# Patient Record
Sex: FEMALE | Race: WHITE | NOT HISPANIC OR LATINO | Employment: OTHER | URBAN - NONMETROPOLITAN AREA
[De-identification: names, ages, dates, MRNs, and addresses within clinical notes are randomized per-mention and may not be internally consistent; named-entity substitution may affect disease eponyms.]

---

## 2019-07-17 ENCOUNTER — EVALUATION (OUTPATIENT)
Dept: PHYSICAL THERAPY | Facility: CLINIC | Age: 84
End: 2019-07-17
Payer: MEDICARE

## 2019-07-17 DIAGNOSIS — M17.12 PRIMARY OSTEOARTHRITIS OF LEFT KNEE: Primary | ICD-10-CM

## 2019-07-17 DIAGNOSIS — M25.562 CHRONIC PAIN OF LEFT KNEE: ICD-10-CM

## 2019-07-17 DIAGNOSIS — G89.29 CHRONIC PAIN OF LEFT KNEE: ICD-10-CM

## 2019-07-17 PROCEDURE — 97161 PT EVAL LOW COMPLEX 20 MIN: CPT | Performed by: PHYSICAL THERAPIST

## 2019-07-17 PROCEDURE — 97110 THERAPEUTIC EXERCISES: CPT | Performed by: PHYSICAL THERAPIST

## 2019-07-17 PROCEDURE — 97140 MANUAL THERAPY 1/> REGIONS: CPT | Performed by: PHYSICAL THERAPIST

## 2019-07-17 PROCEDURE — 97535 SELF CARE MNGMENT TRAINING: CPT | Performed by: PHYSICAL THERAPIST

## 2019-07-17 NOTE — PROGRESS NOTES
PT Evaluation     Today's date: 2019  Patient name: Lydia Hernandez  : 9/3/1931  MRN: [id number]  Referring provider: Wilkie Gosselin  Dx:   Encounter Diagnosis     ICD-10-CM    1  Primary osteoarthritis of left knee M17 12    2  Chronic pain of left knee M25 562     G89 29                   Assessment  Understanding of Dx/Px/POC: good   Prognosis: good    Goals  STGs: To be complete within 4 weeks  - Decrease pain to < 2/10 at worst  - Increase AROM to WNL  - Increase strength to > 4+/5  - Improve gait to WNL for distances < 6 blocks    LTGs: To be complete within 6 weeks  - Able to walk for any extended amount of time/distance without pain or limitation for increased safety and functional capacity with ADLs and home-related duty  - Able to repetitively squat without pain or limitation for increased safety and functional capacity with ADLs and home-related duty  - Able to repetitively ascend/descend a full flight of stairs without pain or limitation for increased safety and functional capacity with ADLs and home-related duty  - Able to repetitively complete transfers without pain or limitation for increased safety and functional capacity with ADLs and home-related duty  - Able to kneel for any extended amount of time without pain or limitation for increased safety and functional capacity with ADLs and home-related duty    Plan  Frequency: 2x week  Duration in weeks: 4       Pt is a very pleasant 80 y o  female with L knee pain who presents with functional deficits including decreased capacity with walking, squatting, kneeling, stairs, and transfers  Upon completion of today's initial evaluation, Jill's sx remain consistent with L knee pain secondary to chronic OA, as well as s/p acute exacerbation of OA > 3 months ago  Patient will benefit from skilled physical therapy to address current deficits            Subjective Evaluation    Pain  Current pain ratin  At best pain ratin  At worst pain rating: 6  Location: L Knee       Pt reports L knee pain for > 3 months  Pt reports pain and stiffness has continued since carrying laundry a few months ago and tripping and falling landing on her L knee  Pt reports her pain and stiffness continues to negatively effect her overall safety and functional capacity with ADLs and home-related duty            Objective Pain level ranges 1-6/10  AROM: L Knee 0-85 degrees; R Knee 0-130 degrees  Strength: L knee 3+/5; R knee 5/5  Gait: Mild shuffle pattern, shortened step length  Swelling: (Mild to mod (will continue to monitor)  Unable to walk without pain and limitation  Unable to squat without pain and limitation  Unable complete transfers without pain and limitation  Unable to ascend/descend stairs without pain and limitation  Unable to kneel without pain and limitation           Precautions: None at this time    Daily Treatment Diary    HEP: Sheet provided and discussed    Manual  7/17/19            ART x15'            IASTM             JM             PROM and LE stretch             STM               Exercise Diary  7/17/19            Gait Correction x10'            TM             Bike             NuStep             Standing 1/2 Roll calf stretch             Towel Calf Stretch 4x20''            HR/TR 2x10            TB TKE 2x10            Ankle Pumps 6x20''                         TB Heel to Toes             Forward/backward heel to toe walk             Sit to stand             No shoe AE [employer] with Add squeeze             SL Bridge             Clam shells             SL Sit to Stand             BOSU SLB             Upside down BOSU SL squat holds             TB Lat Walks             Step ups/downs             HS into QS 3x10                Modalities  7/17/19            MHP             CP x10'            Stim

## 2019-07-17 NOTE — LETTER
2019    [facility] Ol[facility]a  [address]  [address]  [place] [address]    Patient: Mihaela January   YOB: 1931   Date of Visit: 2019     Encounter Diagnosis     ICD-10-CM    1  Primary osteoarthritis of left knee M17 12    2  Chronic pain of left knee M25 562     G89 29        Dear Dr Cat Norton:    Please review the attached Plan of Care from [facility] recent visit  Please verify that you agree therapy should continue by signing the attached document and sending it back to our office  If you have any questions or concerns, please don't hesitate to call  Sincerely,    Lincoln Files, PT, DPT, ATC, ART      Referring Provider:      I certify that I have read the below Plan of Care and certify the need for these services furnished under this plan of treatment while under my care  Tennis Olvera  [address]  [address]  [place] [address]  VIA Facsimile: [phone]          PT Evaluation     Today's date: 2019  Patient name: Mihaela January  : 9/3/1931  MRN: [id number]  Referring provider: Leticia Apodaca  Dx:   Encounter Diagnosis     ICD-10-CM    1  Primary osteoarthritis of left knee M17 12    2   Chronic pain of left knee M25 562     G89 29                   Assessment  Understanding of Dx/Px/POC: good   Prognosis: good    Goals  STGs: To be complete within 4 weeks  - Decrease pain to < 2/10 at worst  - Increase AROM to WNL  - Increase strength to > 4+/5  - Improve gait to WNL for distances < 6 blocks    LTGs: To be complete within 6 weeks  - Able to walk for any extended amount of time/distance without pain or limitation for increased safety and functional capacity with ADLs and home-related duty  - Able to repetitively squat without pain or limitation for increased safety and functional capacity with ADLs and home-related duty  - Able to repetitively ascend/descend a full flight of stairs without pain or limitation for increased safety and functional capacity with ADLs and home-related duty  - Able to repetitively complete transfers without pain or limitation for increased safety and functional capacity with ADLs and home-related duty  - Able to kneel for any extended amount of time without pain or limitation for increased safety and functional capacity with ADLs and home-related duty    Plan  Frequency: 2x week  Duration in weeks: 4       Pt is a very pleasant 80 y o  female with L knee pain who presents with functional deficits including decreased capacity with walking, squatting, kneeling, stairs, and transfers  Upon completion of today's initial evaluation, Jill's sx remain consistent with L knee pain secondary to chronic OA, as well as s/p acute exacerbation of OA > 3 months ago  Patient will benefit from skilled physical therapy to address current deficits  Subjective Evaluation    Pain  Current pain ratin  At best pain ratin  At worst pain ratin  Location: L Knee       Pt reports L knee pain for > 3 months  Pt reports pain and stiffness has continued since carrying laundry a few months ago and tripping and falling landing on her L knee  Pt reports her pain and stiffness continues to negatively effect her overall safety and functional capacity with ADLs and home-related duty            Objective Pain level ranges 1-6/10  AROM: L Knee 0-85 degrees; R Knee 0-130 degrees  Strength: L knee 3+/5; R knee 5/5  Gait: Mild shuffle pattern, shortened step length  Swelling: (Mild to mod (will continue to monitor)  Unable to walk without pain and limitation  Unable to squat without pain and limitation  Unable complete transfers without pain and limitation  Unable to ascend/descend stairs without pain and limitation  Unable to kneel without pain and limitation           Precautions: None at this time    Daily Treatment Diary    HEP: Sheet provided and discussed    Manual  19            ART x15' IASTM             JM             PROM and LE stretch             STM               Exercise Diary  7/17/19            Gait Correction x10'            TM             Bike             NuStep             Standing 1/2 Roll calf stretch             Towel Calf Stretch 4x20''            HR/TR 2x10            TB TKE 2x10            Ankle Pumps 6x20''                         TB Heel to Toes             Forward/backward heel to toe walk             Sit to stand             No shoe AE [employer] with Add squeeze             SL Bridge             Clam shells             SL Sit to Stand             BOSU SLB             Upside down BOSU SL squat holds             TB Lat Walks             Step ups/downs             HS into QS 3x10                Modalities  7/17/19            MHP             CP x10'            Stim

## 2019-07-18 ENCOUNTER — TRANSCRIBE ORDERS (OUTPATIENT)
Dept: PHYSICAL THERAPY | Facility: CLINIC | Age: 84
End: 2019-07-18

## 2019-07-18 DIAGNOSIS — M17.12 OSTEOARTHRITIS OF LEFT KNEE, UNSPECIFIED OSTEOARTHRITIS TYPE: Primary | ICD-10-CM

## 2019-07-19 ENCOUNTER — OFFICE VISIT (OUTPATIENT)
Dept: PHYSICAL THERAPY | Facility: CLINIC | Age: 84
End: 2019-07-19
Payer: MEDICARE

## 2019-07-19 DIAGNOSIS — M25.562 CHRONIC PAIN OF LEFT KNEE: ICD-10-CM

## 2019-07-19 DIAGNOSIS — M17.12 PRIMARY OSTEOARTHRITIS OF LEFT KNEE: Primary | ICD-10-CM

## 2019-07-19 DIAGNOSIS — G89.29 CHRONIC PAIN OF LEFT KNEE: ICD-10-CM

## 2019-07-19 PROCEDURE — 97110 THERAPEUTIC EXERCISES: CPT

## 2019-07-19 PROCEDURE — 97140 MANUAL THERAPY 1/> REGIONS: CPT

## 2019-07-19 NOTE — PROGRESS NOTES
Daily Note     Today's date: 2019  Patient name: Mihaela January  : 9/3/1931  MRN: [id number]  Referring provider: Leticia Apodaca MD  Dx:   Encounter Diagnosis     ICD-10-CM    1  Primary osteoarthritis of left knee M17 12    2  Chronic pain of left knee M25 562     G89 29                   Subjective: Pt reports improvement and is doing HEP  States Heel slides are helpful  Objective: See treatment diary below      Assessment: Tolerated treatment well  Patient exhibited good technique with therapeutic exercises  Pt able to complete program with overall good hua  Pt with decreased c/o pain and improved overall rom  Plan: Continue per plan of care        Precautions: None at this time    Daily Treatment Diary    HEP: Sheet provided and discussed    Manual  19           ART x15'            IASTM             JM             PROM and LE stretch  15'           STM               Exercise Diary  19           Gait Correction x10'            TM             Bike             NuStep  10'           Standing 1/2 Roll calf stretch  6/20"           Towel Calf Stretch 4x20'' 4/20"           HR/TR 2x10 3/10           TB TKE 2x10 L3 3/10           Ankle Pumps 6x20'' 3/10                        TB Heel to Toes             Forward/backward heel to toe walk  10 laps           Sit to stand             No shoe AE [employer] with Add squeeze             SL Bridge             Clam shells             SL Sit to Stand             BOSU SLB             Upside down BOSU SL squat holds             TB Lat Walks  15 laps no TB           Step ups/downs             HS into QS 3x10 3/10               Modalities  19           MHP             CP x10' 10'           Stim

## 2019-07-22 ENCOUNTER — APPOINTMENT (OUTPATIENT)
Dept: PHYSICAL THERAPY | Facility: CLINIC | Age: 84
End: 2019-07-22
Payer: MEDICARE

## 2019-07-24 ENCOUNTER — OFFICE VISIT (OUTPATIENT)
Dept: PHYSICAL THERAPY | Facility: CLINIC | Age: 84
End: 2019-07-24
Payer: MEDICARE

## 2019-07-24 DIAGNOSIS — M25.562 CHRONIC PAIN OF LEFT KNEE: ICD-10-CM

## 2019-07-24 DIAGNOSIS — M17.12 PRIMARY OSTEOARTHRITIS OF LEFT KNEE: Primary | ICD-10-CM

## 2019-07-24 DIAGNOSIS — G89.29 CHRONIC PAIN OF LEFT KNEE: ICD-10-CM

## 2019-07-24 PROCEDURE — 97140 MANUAL THERAPY 1/> REGIONS: CPT

## 2019-07-24 PROCEDURE — 97110 THERAPEUTIC EXERCISES: CPT

## 2019-07-24 NOTE — PROGRESS NOTES
Daily Note     Today's date: 2019  Patient name: Ty Reyes  : 9/3/1931  MRN: [id number]  Referring provider: Saravanan Nicole MD  Dx:   Encounter Diagnosis     ICD-10-CM    1  Primary osteoarthritis of left knee M17 12    2  Chronic pain of left knee M25 562     G89 29                   Subjective: Pt reports she is doing well  States feeling slightly better  Objective: See treatment diary below      Assessment: Tolerated treatment well  Patient demonstrated fatigue post treatment  Pt able to hua program well with mild increased pain  Pt able to complete program with improved knee ext  Plan: Continue per plan of care        Precautions: None at this time    Daily Treatment Diary    HEP: Sheet provided and discussed    Manual  19          ART x15'            IASTM             JM             PROM and LE stretch  15' 15'          STM               Exercise Diary  19          Gait Correction x10'            TM             Bike             NuStep  10' 10'          Standing 1/2 Roll calf stretch  " "          Towel Calf Stretch 4x20'' " "          HR/TR 2x10 3/10 3/10          TB TKE 2x10 L3 3/10 L3 3/10          Ankle Pumps 6x20'' 3/10 3/10                       TB Heel to Toes             Forward/backward heel to toe walk  10 laps 10 laps          Sit to stand             No shoe AE [employer]             Bridge with Add squeeze             SL Bridge             Clam shells             SL Sit to Stand             BOSU SLB             Upside down BOSU SL squat holds             TB Lat Walks  15 laps no TB 15 laps          Step ups/downs             HS into QS 3x10 3/10 3/10              Modalities  19          MHP             CP x10' 10' 10'          Stim

## 2019-07-25 ENCOUNTER — OFFICE VISIT (OUTPATIENT)
Dept: PHYSICAL THERAPY | Facility: CLINIC | Age: 84
End: 2019-07-25
Payer: MEDICARE

## 2019-07-25 DIAGNOSIS — M17.12 PRIMARY OSTEOARTHRITIS OF LEFT KNEE: Primary | ICD-10-CM

## 2019-07-25 DIAGNOSIS — G89.29 CHRONIC PAIN OF LEFT KNEE: ICD-10-CM

## 2019-07-25 DIAGNOSIS — M25.562 CHRONIC PAIN OF LEFT KNEE: ICD-10-CM

## 2019-07-25 PROCEDURE — 97140 MANUAL THERAPY 1/> REGIONS: CPT

## 2019-07-25 PROCEDURE — 97110 THERAPEUTIC EXERCISES: CPT

## 2019-07-25 NOTE — PROGRESS NOTES
Daily Note     Today's date: 2019  Patient name: Naveed Case  : 9/3/1931  MRN: [id number]  Referring provider: Vianca Gatica MD  Dx:   Encounter Diagnosis     ICD-10-CM    1  Primary osteoarthritis of left knee M17 12    2  Chronic pain of left knee M25 562     G89 29                   Subjective: Pt states her knee bending is improving  States she has no pain but some cont swelling  Objective: See treatment diary below      Assessment: Tolerated treatment well  Patient exhibited good technique with therapeutic exercises  Pt hua program well and cont to improve with overall rom and strength  Pt cont to make gains  Plan: Continue per plan of care        Precautions: None at this time    Daily Treatment Diary    HEP: Sheet provided and discussed    Manual  19         ART x15'            IASTM             JM             PROM and LE stretch  15' 15' 15'         STM               Exercise Diary  19         Gait Correction x10'            TM             Bike             NuStep  10' 10' 10'         Standing 1/2 Roll calf stretch  " " "         Towel Calf Stretch 4x20'' " " "         HR/TR 2x10 3/10 3/10 3/10         TB TKE 2x10 L3 3/10 L3 3/10 L3 3/10         Ankle Pumps 6x20'' 3/10 3/10 3/10                      TB Heel to Toes             Forward/backward heel to toe walk  10 laps 10 laps 10 laps         Sit to stand             No shoe AE [employer]             Bridge with Add squeeze             SL Bridge             Clam shells             SL Sit to Stand             BOSU SLB             Upside down BOSU SL squat holds             TB Lat Walks  15 laps no TB 15 laps 15 laps         Step ups/downs             HS into QS 3x10 3/10 3/10 3/10             Modalities  19         MHP             CP x10' 10' 10' 10'         Stim

## 2019-07-29 ENCOUNTER — OFFICE VISIT (OUTPATIENT)
Dept: PHYSICAL THERAPY | Facility: CLINIC | Age: 84
End: 2019-07-29
Payer: MEDICARE

## 2019-07-29 DIAGNOSIS — M17.12 PRIMARY OSTEOARTHRITIS OF LEFT KNEE: Primary | ICD-10-CM

## 2019-07-29 DIAGNOSIS — G89.29 CHRONIC PAIN OF LEFT KNEE: ICD-10-CM

## 2019-07-29 DIAGNOSIS — M25.562 CHRONIC PAIN OF LEFT KNEE: ICD-10-CM

## 2019-07-29 PROCEDURE — 97110 THERAPEUTIC EXERCISES: CPT | Performed by: PHYSICAL THERAPIST

## 2019-07-29 PROCEDURE — 97140 MANUAL THERAPY 1/> REGIONS: CPT | Performed by: PHYSICAL THERAPIST

## 2019-07-29 PROCEDURE — 97112 NEUROMUSCULAR REEDUCATION: CPT | Performed by: PHYSICAL THERAPIST

## 2019-07-29 NOTE — PROGRESS NOTES
Daily Note     Today's date: 2019  Patient name: Mihaela January  : 9/3/1931  MRN: [id number]  Referring provider: Leticai Apodaca MD  Dx:   Encounter Diagnosis     ICD-10-CM    1  Primary osteoarthritis of left knee M17 12    2  Chronic pain of left knee M25 562     G89 29                   Subjective: Pt reports continue progress  Can tell her AROM is improving each day  Anxious to continue making progress  Objective: See treatment diary below      Assessment: Tolerated treatment well  Patient demonstrated fatigue post treatment, exhibited good technique with therapeutic exercises and would benefit from continued PT  Pt hua program well and cont to improve with overall rom and strength  Pt cont to make gains  AROM 0-105 degrees today  Plan: Continue per plan of care  Progress treatment as tolerated         Precautions: None at this time    Daily Treatment Diary    HEP: Sheet provided and discussed    Manual  19         ART x15'            IASTM             JM             PROM and LE stretch  15' 15' 15'         STM               Exercise Diary  19         Gait Correction x10'            TM             Bike             NuStep  10' 10' 10'         Standing 1/2 Roll calf stretch  6/20" 620" 6"         Towel Calf Stretch 4x20'' 420" " "         HR/TR 2x10 3/10 3/10 3/10         TB TKE 2x10 L3 3/10 L3 3/10 L3 3/10         Ankle Pumps 6x20'' 3/10 3/10 3/10                      TB Heel to Toes             Forward/backward heel to toe walk  10 laps 10 laps 10 laps         Sit to stand             No shoe AE [employer] with Add squeeze             SL Bridge             Clam shells             SL Sit to Stand             BOSU SLB             Upside down BOSU SL squat holds             TB Lat Walks  15 laps no TB 15 laps 15 laps         Step ups/downs             HS into QS 3x10 3/10 3/10 3/10             Modalities 7/17/19 7/19/19 7/24/19 7/29/19         P             CP x10' 10' 10' 10'         Stim

## 2019-07-31 ENCOUNTER — OFFICE VISIT (OUTPATIENT)
Dept: PHYSICAL THERAPY | Facility: CLINIC | Age: 84
End: 2019-07-31
Payer: MEDICARE

## 2019-07-31 DIAGNOSIS — G89.29 CHRONIC PAIN OF LEFT KNEE: ICD-10-CM

## 2019-07-31 DIAGNOSIS — M25.562 CHRONIC PAIN OF LEFT KNEE: ICD-10-CM

## 2019-07-31 DIAGNOSIS — M17.12 PRIMARY OSTEOARTHRITIS OF LEFT KNEE: Primary | ICD-10-CM

## 2019-07-31 PROCEDURE — 97140 MANUAL THERAPY 1/> REGIONS: CPT | Performed by: PHYSICAL THERAPIST

## 2019-07-31 PROCEDURE — 97112 NEUROMUSCULAR REEDUCATION: CPT | Performed by: PHYSICAL THERAPIST

## 2019-07-31 PROCEDURE — 97110 THERAPEUTIC EXERCISES: CPT | Performed by: PHYSICAL THERAPIST

## 2019-07-31 NOTE — PROGRESS NOTES
Daily Note     Today's date: 2019  Patient name: Margot Cassidy  : 9/3/1931  MRN: [id number]  Referring provider: Liliya Lucero MD  Dx:   Encounter Diagnosis     ICD-10-CM    1  Primary osteoarthritis of left knee M17 12    2  Chronic pain of left knee M25 562     G89 29        Start Time: 6995  Stop Time: 1505  Total time in clinic (min): 70 minutes    Subjective: Patient states she forgets to bend knee because she hadn't for so long  No c/o pain today  Objective: See treatment diary below      Assessment: Verbal cueing needed for proper execution of exercises  Avoided knee flexion with backwards walking landing in foot flat causing instability, improved with cueing  Needs to continue to work on knee flexion strength for increased stability and normalization of gait  Did note some redness around wound on distal medial/lateral low leg caused from hitting leg into  2 days ago  Instructed patient to watch area of redness does not get larger  Patient would benefit from continuation of skilled PT  Plan: Continue per plan of care  Progress treatment as tolerated         Precautions: None at this time    Daily Treatment Diary    HEP: Sheet provided and discussed    Manual  19        ART x15'            IASTM             JM             PROM and LE stretch  15' 15' 15' 15'        STM               Exercise Diary  19        Gait Correction x10'            TM             Bike             NuStep  10' 10' 10' L3 10'        Standing 1/2 Roll calf stretch  " " " 6x20"        Towel Calf Stretch 4x20'' " " " "        HR/TR 2x10 3/10 3/10 3/10 3x10        TB TKE 2x10 L3 3/10 L3 3/10 L3 3/10 L3 with towel behind knee 3x10        Ankle Pumps 6x20'' 3/10 3/10 3/10         Hamstring curls     3x10        TB Heel to Toes             Forward/backward heel to toe walk  10 laps 10 laps 10 laps 10 laps         Sit to stand             No shoe AE [employer] with Add squeeze             SL Bridge             Clam shells             SL Sit to Stand             BOSU SLB             Upside down BOSU SL squat holds             TB Lat Walks  15 laps no TB 15 laps 15 laps 15 laps        Step ups/downs     6" 3x10        HS into QS 3x10 3/10 3/10 3/10 3x10            Modalities  7/17/19 7/19/19 7/24/19 7/29/19 7/31        MHP             CP x10' 10' 10' 10' 10'        Stim

## 2019-08-05 ENCOUNTER — OFFICE VISIT (OUTPATIENT)
Dept: PHYSICAL THERAPY | Facility: CLINIC | Age: 84
End: 2019-08-05
Payer: MEDICARE

## 2019-08-05 DIAGNOSIS — G89.29 CHRONIC PAIN OF LEFT KNEE: ICD-10-CM

## 2019-08-05 DIAGNOSIS — M17.12 PRIMARY OSTEOARTHRITIS OF LEFT KNEE: Primary | ICD-10-CM

## 2019-08-05 DIAGNOSIS — M25.562 CHRONIC PAIN OF LEFT KNEE: ICD-10-CM

## 2019-08-05 PROCEDURE — 97140 MANUAL THERAPY 1/> REGIONS: CPT | Performed by: PHYSICAL THERAPIST

## 2019-08-05 PROCEDURE — 97112 NEUROMUSCULAR REEDUCATION: CPT | Performed by: PHYSICAL THERAPIST

## 2019-08-05 PROCEDURE — 97110 THERAPEUTIC EXERCISES: CPT | Performed by: PHYSICAL THERAPIST

## 2019-08-05 NOTE — PROGRESS NOTES
Daily Note     Today's date: 2019  Patient name: Ary Anderson  : 9/3/1931  MRN: [id number]  Referring provider: Rosalind Harrison MD  Dx:   Encounter Diagnosis     ICD-10-CM    1  Primary osteoarthritis of left knee M17 12    2  Chronic pain of left knee M25 562     G89 29                   Subjective: Patient reports she feels like she is progressing with ROM, stability, gait, and strength  Objective: See treatment diary below      Assessment: Verbal cueing needed for proper execution of exercises  Avoided knee flexion with backwards walking landing in foot flat causing instability, improved with cueing  Needs to continue to work on knee flexion strength for increased stability and normalization of gait  Noted improvement in redness around wound on distal medial/lateral low leg caused from hitting leg into  last week  Instructed patient to continued to watch area of redness does not get larger  Will continue to monitor  Patient would benefit from continuation of skilled PT  Plan: Continue per plan of care  Progress treatment as tolerated         Precautions: None at this time    Daily Treatment Diary    HEP: Sheet provided and discussed    Manual  19        ART x15'            IASTM             JM             PROM and LE stretch  15' 15' 15' 15'        STM               Exercise Diary  19        Gait Correction x10'            TM             Bike             NuStep  10' 10' 10' L3 10'        Standing 1/2 Roll calf stretch  " " " 6x20"        Towel Calf Stretch 4x20'' " " " "        HR/TR 2x10 3/10 3/10 3/10 3x10        TB TKE 2x10 L3 3/10 L3 3/10 L3 3/10 L3 with towel behind knee 3x10        Ankle Pumps 6x20'' 3/10 3/10 3/10         Hamstring curls     3x10        TB Heel to Toes             Forward/backward heel to toe walk  10 laps 10 laps 10 laps 10 laps         Sit to stand No shoe AE [employer]             Bridge with Add squeeze             SL Bridge             Clam shells             SL Sit to Stand             BOSU SLB             Upside down BOSU SL squat holds             TB Lat Walks  15 laps no TB 15 laps 15 laps 15 laps        Step ups/downs     6" 3x10        HS into QS 3x10 3/10 3/10 3/10 3x10            Modalities  7/17/19 7/19/19 7/24/19 7/29/19 8/5/19        MHP             CP x10' 10' 10' 10' 10'        Stim

## 2019-08-07 ENCOUNTER — OFFICE VISIT (OUTPATIENT)
Dept: PHYSICAL THERAPY | Facility: CLINIC | Age: 84
End: 2019-08-07
Payer: MEDICARE

## 2019-08-07 DIAGNOSIS — M17.12 PRIMARY OSTEOARTHRITIS OF LEFT KNEE: Primary | ICD-10-CM

## 2019-08-07 DIAGNOSIS — G89.29 CHRONIC PAIN OF LEFT KNEE: ICD-10-CM

## 2019-08-07 DIAGNOSIS — M25.562 CHRONIC PAIN OF LEFT KNEE: ICD-10-CM

## 2019-08-07 PROCEDURE — 97140 MANUAL THERAPY 1/> REGIONS: CPT

## 2019-08-07 PROCEDURE — 97110 THERAPEUTIC EXERCISES: CPT

## 2019-08-07 NOTE — PROGRESS NOTES
Daily Note     Today's date: 2019  Patient name: Fiona Bowman  : 9/3/1931  MRN: [id number]  Referring provider: Odessa Burris MD  Dx:   Encounter Diagnosis     ICD-10-CM    1  Primary osteoarthritis of left knee M17 12    2  Chronic pain of left knee M25 562     G89 29                   Subjective: Pt reports overall improvement  Pt states she cont to make gains and is able to go up/down stairs  Objective: See treatment diary below      Assessment: Tolerated treatment well  Patient exhibited good technique with therapeutic exercises  Pt able to demonstrate 118 degrees of knee flx PROM today  Pt cont to make gains with overall improvement  Plan: Continue per plan of care        Precautions: None at this time    Daily Treatment Diary    HEP: Sheet provided and discussed    Manual  19       ART x15'            IASTM             JM             PROM and LE stretch  15' 15' 15' 15' 15'       STM               Exercise Diary  19       Gait Correction x10'            TM             Bike             NuStep  10' 10' 10' L3 10' L3 10'       Standing 1/2 Roll calf stretch  " " " 6x20" "       Towel Calf Stretch 4x20'' " " 20" " "       HR/TR 2x10 3/10 3/10 3/10 3x10 3/10       TB TKE 2x10 L3 3/10 L3 3/10 L3 3/10 L3 with towel behind knee 3x10 L3 3/10       Ankle Pumps 6x20'' 3/10 3/10 3/10         Hamstring curls     3x10        TB Heel to Toes             Forward/backward heel to toe walk  10 laps 10 laps 10 laps 10 laps  10 laps       Sit to stand             No shoe AE [employer] with Add squeeze             SL Bridge             Clam shells             SL Sit to Stand             BOSU SLB             Upside down BOSU SL squat holds             TB Lat Walks  15 laps no TB 15 laps 15 laps 15 laps 15 laps       Step ups/downs     6" 3x10 6" 3/10       HS into QS 3x10 3/10 3/10 3/10 3x10 3/10           Modalities  7/17/19 7/19/19 7/24/19 7/29/19 8/5/19 8/7/19       MHP             CP x10' 10' 10' 10' 10' 10'       Stim

## 2019-08-12 ENCOUNTER — OFFICE VISIT (OUTPATIENT)
Dept: PHYSICAL THERAPY | Facility: CLINIC | Age: 84
End: 2019-08-12
Payer: MEDICARE

## 2019-08-12 DIAGNOSIS — M17.12 PRIMARY OSTEOARTHRITIS OF LEFT KNEE: Primary | ICD-10-CM

## 2019-08-12 DIAGNOSIS — M25.562 CHRONIC PAIN OF LEFT KNEE: ICD-10-CM

## 2019-08-12 DIAGNOSIS — G89.29 CHRONIC PAIN OF LEFT KNEE: ICD-10-CM

## 2019-08-12 PROCEDURE — 97140 MANUAL THERAPY 1/> REGIONS: CPT

## 2019-08-12 PROCEDURE — 97110 THERAPEUTIC EXERCISES: CPT

## 2019-08-12 NOTE — PROGRESS NOTES
Daily Note     Today's date: 2019  Patient name: Darion Rachel  : 9/3/1931  MRN: [id number]  Referring provider: Lois Caban MD  Dx:   Encounter Diagnosis     ICD-10-CM    1  Primary osteoarthritis of left knee M17 12    2  Chronic pain of left knee M25 562     G89 29                   Subjective: Pt reports doing well and will see MD tomorrow  Pt states she cont to work on ROM  Objective: See treatment diary below      Assessment: Tolerated treatment well  Patient demonstrated fatigue post treatment  Pt with notable fatigue today with recumbent bike  Pt demonstrated 110 degree of knee flx AROM and 118 degrees of PROM  Pt cont to progress  Plan: Continue per plan of care  RC due next visit        Precautions: None at this time    Daily Treatment Diary    HEP: Sheet provided and discussed    Manual  19      ART x15'            IASTM             JM             PROM and LE stretch  15' 15' 15' 15' 15' 15'      STM               Exercise Diary  19      Gait Correction x10'            TM             Bike       L3 10'      NuStep  10' 10' 10' L3 10' L3 10'       Standing 1/2 Roll calf stretch  20" " " 6x20" " "      Towel Calf Stretch 4x20'' " " " " " "      HR/TR 2x10 3/10 3/10 3/10 3x10 3/10 3/10      TB TKE 2x10 L3 3/10 L3 3/10 L3 3/10 L3 with towel behind knee 3x10 L3 3/10 L4 3/10      Ankle Pumps 6x20'' 3/10 3/10 3/10         Hamstring curls     3x10  3/10      TB Heel to Toes             Forward/backward heel to toe walk  10 laps 10 laps 10 laps 10 laps  10 laps 10 laps      Sit to stand             No shoe AE Steamboats             Bridge with Add squeeze             SL Bridge             Clam shells             SL Sit to Stand             BOSU SLB             Upside down BOSU SL squat holds             TB Lat Walks  15 laps no TB 15 laps 15 laps 15 laps 15 laps 15 laps      Step ups/downs     6" 3x10 6" 3/10 6'' 3/10      HS into QS 3x10 3/10 3/10 3/10 3x10 3/10 3/10          Modalities  7/17/19 7/19/19 7/24/19 7/29/19 8/5/19 8/7/19 8/12/19      MHP             CP x10' 10' 10' 10' 10' 10' 10'      Stim

## 2019-08-14 ENCOUNTER — OFFICE VISIT (OUTPATIENT)
Dept: PHYSICAL THERAPY | Facility: CLINIC | Age: 84
End: 2019-08-14
Payer: MEDICARE

## 2019-08-14 DIAGNOSIS — M17.12 PRIMARY OSTEOARTHRITIS OF LEFT KNEE: Primary | ICD-10-CM

## 2019-08-14 DIAGNOSIS — M25.562 CHRONIC PAIN OF LEFT KNEE: ICD-10-CM

## 2019-08-14 DIAGNOSIS — G89.29 CHRONIC PAIN OF LEFT KNEE: ICD-10-CM

## 2019-08-14 PROCEDURE — 97530 THERAPEUTIC ACTIVITIES: CPT | Performed by: PHYSICAL THERAPIST

## 2019-08-14 PROCEDURE — 97140 MANUAL THERAPY 1/> REGIONS: CPT | Performed by: PHYSICAL THERAPIST

## 2019-08-14 PROCEDURE — 97112 NEUROMUSCULAR REEDUCATION: CPT | Performed by: PHYSICAL THERAPIST

## 2019-08-14 PROCEDURE — 97110 THERAPEUTIC EXERCISES: CPT | Performed by: PHYSICAL THERAPIST

## 2019-08-14 NOTE — PROGRESS NOTES
PT Discharge    Today's date: 2019  Patient name: Domi Noyola  : 9/3/1931  MRN: [id number]  Referring provider: Zack Boxer, MD  Dx:   Encounter Diagnosis     ICD-10-CM    1  Primary osteoarthritis of left knee M17 12    2  Chronic pain of left knee M25 562     G89 29                     Goals  STGs: To be complete within 4 weeks (All Met)  - Decrease pain to < 2/10 at worst  - Increase AROM to WNL  - Increase strength to > 4+/5  - Improve gait to WNL for distances < 6 blocks    LTGs: To be complete within 6 weeks (All Met)  - Able to walk for any extended amount of time/distance without pain or limitation for increased safety and functional capacity with ADLs and home-related duty  - Able to repetitively squat without pain or limitation for increased safety and functional capacity with ADLs and home-related duty  - Able to repetitively ascend/descend a full flight of stairs without pain or limitation for increased safety and functional capacity with ADLs and home-related duty  - Able to repetitively complete transfers without pain or limitation for increased safety and functional capacity with ADLs and home-related duty  - Able to kneel for any extended amount of time without pain or limitation for increased safety and functional capacity with ADLs and home-related duty       Pt will be D/C from physical therapy after today's session, as she has achieved all personal and functional goals  Pt has a good understanding of HEP and has been instructed to reach out with any questions/concerns/setbacks  Subjective Evaluation    Pain  Current pain ratin  At best pain ratin  At worst pain ratin  Location: L Knee         Pt reports she is ready to safely D/C to HEP after today's session as she has achieved all personal and functional goals  Pt reports she has a good understanding of HEP and will reach out with any questions/concerns/setbacks            Objective Pain level ranges 0/10  AROM: L Knee 0-125 degrees; R Knee 0-130 degrees  Strength: L knee 5/5; R knee 5/5  Gait: WNL  Swelling: Mild  Able to walk without pain and limitation  Able to squat without pain and limitation  Able complete transfers without pain and limitation  Able to ascend/descend stairs without pain and limitation  Able to kneel without pain and limitation           Precautions: None at this time    Daily Treatment Diary    HEP: Sheet provided and discussed    Manual  7/17/19 7/19/19 7/24/19 7/29/19 8/5/19 8/7/19 8/14/19      ART x15'            IASTM             JM             PROM and LE stretch  15' 15' 15' 15' 15' 15'      STM               Exercise Diary  7/17/19 7/19/19 7/24/19 7/29/19 8/5/19 8/7/19 8/14/19      Gait Correction x10'            TM             Bike       L3 10'      NuStep  10' 10' 10' L3 10' L3 10'       Standing 1/2 Roll calf stretch  6/20" 6/20" 6/20" 6x20" 6/20" 6/20"      Towel Calf Stretch 4x20'' 4/20" 4/20" 4/20" 4/20" 4/20" 4/20"      HR/TR 2x10 3/10 3/10 3/10 3x10 3/10 3/10      TB TKE 2x10 L3 3/10 L3 3/10 L3 3/10 L3 with towel behind knee 3x10 L3 3/10 L4 3/10      Ankle Pumps 6x20'' 3/10 3/10 3/10         Hamstring curls     3x10  3/10      TB Heel to Toes             Forward/backward heel to toe walk  10 laps 10 laps 10 laps 10 laps  10 laps 10 laps      Sit to stand             No shoe AE Steamboats             Bridge with Add squeeze             SL Bridge             Clam shells             SL Sit to Stand             BOSU SLB             Upside down BOSU SL squat holds             TB Lat Walks  15 laps no TB 15 laps 15 laps 15 laps 15 laps 15 laps      Step ups/downs     6" 3x10 6" 3/10 6'' 3/10      HS into QS 3x10 3/10 3/10 3/10 3x10 3/10 3/10          Modalities  7/17/19 7/19/19 7/24/19 7/29/19 8/5/19 8/7/19 8/14/19      MHP             CP x10' 10' 10' 10' 10' 10' 10'      Stim

## 2024-11-22 ENCOUNTER — TELEPHONE (OUTPATIENT)
Age: 89
End: 2024-11-22

## 2024-11-22 NOTE — TELEPHONE ENCOUNTER
11/22/24    Patient  Mr. Liao called office today requesting to see Neurology for Memory problem .       Mr. Liao stated that he was concerned for his Wife Memory.     I informed Mr. Liao the Process of Patient over 65 with Memory Problem and Advice to reach out to [facility] for appropriate Assistance for his Wife.    Mr. Liao UNDERSTOOD and AGREED.     I Provided [facility] Contact Number [phone].    Mr. Liao made a note of the Number and Expressed his Thanks.       Any questions, please contact patient .   Thank You.

## 2024-11-22 NOTE — TELEPHONE ENCOUNTER
Patient's spouse, Keshawn, called to schedule memory evaluation.    Keshawn [facility] is too far to travel.  Assisted caller to identify Neurologists in his area which he will contact.    One number provided was for [facility] on [address] [facility] in [place].  Another was for  Neurology practice in [place].

## 2024-11-26 NOTE — TELEPHONE ENCOUNTER
Spoke to patient's spouse, Keshawn he was only interested in appointments in [place] location. Unfortunately there is not a provider that goes to [place]. I provided Keshawn with locations where our neurologist see patient's at.  I asked Keshawn if he called [facility], he stated he did and that they were booking far out and referred him to [facility].  Keshawn was appreciative of call back and information.